# Patient Record
Sex: FEMALE | URBAN - METROPOLITAN AREA
[De-identification: names, ages, dates, MRNs, and addresses within clinical notes are randomized per-mention and may not be internally consistent; named-entity substitution may affect disease eponyms.]

---

## 2019-10-14 ENCOUNTER — APPOINTMENT (OUTPATIENT)
Age: 59
Setting detail: DERMATOLOGY
End: 2019-10-15

## 2019-10-14 DIAGNOSIS — L21.8 OTHER SEBORRHEIC DERMATITIS: ICD-10-CM

## 2019-10-14 DIAGNOSIS — L53.8 OTHER SPECIFIED ERYTHEMATOUS CONDITIONS: ICD-10-CM

## 2019-10-14 PROBLEM — L30.9 DERMATITIS, UNSPECIFIED: Status: ACTIVE | Noted: 2019-10-14

## 2019-10-14 PROCEDURE — OTHER ADDITIONAL NOTES: OTHER

## 2019-10-14 PROCEDURE — OTHER COUNSELING: OTHER

## 2019-10-14 PROCEDURE — OTHER PRESCRIPTION: OTHER

## 2019-10-14 PROCEDURE — OTHER REASSURANCE: OTHER

## 2019-10-14 PROCEDURE — OTHER INTRAMUSCULAR KENALOG: OTHER

## 2019-10-14 PROCEDURE — 96372 THER/PROPH/DIAG INJ SC/IM: CPT

## 2019-10-14 PROCEDURE — 99203 OFFICE O/P NEW LOW 30 MIN: CPT | Mod: 25

## 2019-10-14 RX ORDER — DESONIDE 0.5 MG/G
CREAM TOPICAL BID
Qty: 1 | Refills: 3 | Status: ERX | COMMUNITY
Start: 2019-10-14

## 2019-10-14 ASSESSMENT — LOCATION DETAILED DESCRIPTION DERM: LOCATION DETAILED: LEFT DELTOID

## 2019-10-14 ASSESSMENT — LOCATION SIMPLE DESCRIPTION DERM: LOCATION SIMPLE: LEFT DELTOID

## 2019-10-14 ASSESSMENT — LOCATION ZONE DERM: LOCATION ZONE: SHOULDER

## 2019-10-14 NOTE — PROCEDURE: INTRAMUSCULAR KENALOG
Detail Level: None
Consent: The risks of atrophy were reviewed with the patient.
Administered By (Optional): Jennie
Concentration (Mg/Ml) Of Additional Medication: 2.5
Add Option For Additional Mediation: No
Kenalog Preparation: kenalog
Concentration (Mg/Ml): 40.0
Total Volume (Ccs): 1

## 2019-10-14 NOTE — PROCEDURE: ADDITIONAL NOTES
Additional Notes: Offered biopsy with culture for proper diagnosis but pt. Declined biopsy, wishes to try empirical therapy first
Detail Level: Simple

## 2019-10-14 NOTE — PROCEDURE: REASSURANCE
Detail Level: Detailed
Hide Additional Notes?: No
Additional Notes (Optional): Recommended Toleriane hydrating face wash, Vanicream HC Am and Toleriane soothing moisturizer for face daily.

## 2019-10-28 ENCOUNTER — APPOINTMENT (OUTPATIENT)
Age: 59
Setting detail: DERMATOLOGY
End: 2019-10-29

## 2019-10-28 DIAGNOSIS — L53.8 OTHER SPECIFIED ERYTHEMATOUS CONDITIONS: ICD-10-CM

## 2019-10-28 DIAGNOSIS — L71.0 PERIORAL DERMATITIS: ICD-10-CM

## 2019-10-28 PROCEDURE — OTHER COUNSELING: OTHER

## 2019-10-28 PROCEDURE — 99213 OFFICE O/P EST LOW 20 MIN: CPT

## 2019-10-28 PROCEDURE — OTHER ADDITIONAL NOTES: OTHER
